# Patient Record
Sex: MALE | Race: WHITE | NOT HISPANIC OR LATINO | URBAN - METROPOLITAN AREA
[De-identification: names, ages, dates, MRNs, and addresses within clinical notes are randomized per-mention and may not be internally consistent; named-entity substitution may affect disease eponyms.]

---

## 2023-08-30 LAB
HEPATITIS B VIRUS SURFACE AB (MIU/ML) IN SERUM: <3.1 MIU/ML
MUMPS IGG ANTIBODY: POSITIVE
RUBELLA VIRUS IGG AB: POSITIVE
RUBEOLA IGG ANTIBODY: POSITIVE
VARICELLA ZOSTER IGG: POSITIVE

## 2023-09-01 LAB
NIL(NEG) CONTROL SPOT COUNT: NORMAL
PANEL A SPOT COUNT: 0
PANEL B SPOT COUNT: 0
POS CONTROL SPOT COUNT: NORMAL
T-SPOT. TB INTERPRETATION: NEGATIVE

## 2023-10-23 ENCOUNTER — APPOINTMENT (OUTPATIENT)
Dept: UROLOGY | Facility: HOSPITAL | Age: 53
End: 2023-10-23

## 2023-10-24 ENCOUNTER — PHARMACY VISIT (OUTPATIENT)
Dept: PHARMACY | Facility: CLINIC | Age: 53
End: 2023-10-24
Payer: COMMERCIAL

## 2023-10-24 ENCOUNTER — LAB (OUTPATIENT)
Dept: LAB | Facility: LAB | Age: 53
End: 2023-10-24
Payer: COMMERCIAL

## 2023-10-24 ENCOUNTER — OFFICE VISIT (OUTPATIENT)
Dept: UROLOGY | Facility: CLINIC | Age: 53
End: 2023-10-24
Payer: COMMERCIAL

## 2023-10-24 VITALS — HEIGHT: 67 IN | WEIGHT: 223 LBS | TEMPERATURE: 97.9 F | BODY MASS INDEX: 35 KG/M2

## 2023-10-24 DIAGNOSIS — Z12.5 PROSTATE CANCER SCREENING: ICD-10-CM

## 2023-10-24 DIAGNOSIS — N40.1 BENIGN PROSTATIC HYPERPLASIA WITH LOWER URINARY TRACT SYMPTOMS, SYMPTOM DETAILS UNSPECIFIED: ICD-10-CM

## 2023-10-24 DIAGNOSIS — N40.1 BENIGN PROSTATIC HYPERPLASIA WITH LOWER URINARY TRACT SYMPTOMS, SYMPTOM DETAILS UNSPECIFIED: Primary | ICD-10-CM

## 2023-10-24 PROCEDURE — 1036F TOBACCO NON-USER: CPT | Performed by: UROLOGY

## 2023-10-24 PROCEDURE — 36415 COLL VENOUS BLD VENIPUNCTURE: CPT

## 2023-10-24 PROCEDURE — 99204 OFFICE O/P NEW MOD 45 MIN: CPT | Performed by: UROLOGY

## 2023-10-24 PROCEDURE — 84153 ASSAY OF PSA TOTAL: CPT

## 2023-10-24 PROCEDURE — RXMED WILLOW AMBULATORY MEDICATION CHARGE

## 2023-10-24 PROCEDURE — 87086 URINE CULTURE/COLONY COUNT: CPT

## 2023-10-24 RX ORDER — TAMSULOSIN HYDROCHLORIDE 0.4 MG/1
0.4 CAPSULE ORAL NIGHTLY
Qty: 90 CAPSULE | Refills: 3 | Status: SHIPPED | OUTPATIENT
Start: 2023-10-24

## 2023-10-24 RX ORDER — ESOMEPRAZOLE MAGNESIUM 40 MG/1
40 CAPSULE, DELAYED RELEASE ORAL
COMMUNITY
Start: 2015-09-30 | End: 2023-11-13 | Stop reason: SDUPTHER

## 2023-10-24 RX ORDER — TAMSULOSIN HYDROCHLORIDE 0.4 MG/1
0.4 CAPSULE ORAL NIGHTLY
COMMUNITY
End: 2023-10-24 | Stop reason: SDUPTHER

## 2023-10-24 RX ORDER — FLUTICASONE PROPIONATE 50 MCG
SPRAY, SUSPENSION (ML) NASAL
COMMUNITY
End: 2023-11-13 | Stop reason: SDUPTHER

## 2023-10-24 RX ORDER — ALLOPURINOL 300 MG/1
300 TABLET ORAL DAILY
COMMUNITY
End: 2023-11-13 | Stop reason: SDUPTHER

## 2023-10-24 ASSESSMENT — PAIN SCALES - GENERAL: PAINLEVEL: 0-NO PAIN

## 2023-10-24 NOTE — PROGRESS NOTES
HPI  53 y.o. male here for BPH management.    #LUTS  -nocturia: no   -slow stream: no   -incomplete emptying:  no   -required catheter: no   -hematuria/dysuria: none  -previous UTIs: none     -urgency: none   -urge incontinence: none      -on medications: yes on flomax for 6 years at [facility] - works well, happy with this  -previous prostate surgery? none   -personal/family history of prostate cancer: none   -on any blood thinners:  no     Hx of prostatitis years ago     Transferring care to  due to insurance change    01/2023 Cr 1.10     - [place]    Current Medications:  Current Outpatient Medications   Medication Sig Dispense Refill    allopurinol (Zyloprim) 300 mg tablet Take 1 tablet (300 mg) by mouth once daily.      esomeprazole (NexIUM) 40 mg DR capsule Take 1 capsule (40 mg) by mouth once daily.      fluticasone (Flonase) 50 mcg/actuation nasal spray USE 1 SPRAY IN EACH NOSTRIL DAILY AT BEDTIME AS DIRECTED.      tamsulosin (Flomax) 0.4 mg 24 hr capsule Take 1 capsule (0.4 mg) by mouth once daily at bedtime.       No current facility-administered medications for this visit.        PMH:  No past medical history on file.    PSH:  No past surgical history on file.    FMH:  No family history on file.    SHx:  Social History     Tobacco Use    Smoking status: Never    Smokeless tobacco: Never   Substance Use Topics    Alcohol use: Yes    Drug use: Never       Allergies:  No Known Allergies    Physical Exam   Testicles descended bilaterally, nontender, no masses  Vasa palpable bilaterally  Penis circ'd, no lesions, no plaques    Assesment/Plan  #LUTS   We discussed the different causes for urinary difficulties as patients age, specifically BPH, bladder overactivity, and even stricture disease. We discussed different medications that could help him with his symptoms, including alpha blockers and finasteride, as well as surgical options such as TURP and Urolift.   -continue flomax 0.4mg,  refilled today  -PSA now and in 1yr  -PVR/ UAcx today     FU in 1 yr with CNP, sooner prn    Scribe Attestation  By signing my name below, I, Ila Carrasco-Jorge , Scrvinay   attest that this documentation has been prepared under the direction and in the presence of Danielle Aguilar MD.

## 2023-10-25 LAB
BACTERIA UR CULT: NO GROWTH
PSA SERPL-MCNC: 0.19 NG/ML

## 2023-11-09 PROBLEM — E66.811 OBESITY, CLASS I, BMI 30-34.9: Status: ACTIVE | Noted: 2021-10-13

## 2023-11-09 PROBLEM — N41.1 CHRONIC PROSTATITIS: Status: ACTIVE | Noted: 2017-12-22

## 2023-11-09 PROBLEM — J45.20 MILD INTERMITTENT ASTHMA WITHOUT COMPLICATION (HHS-HCC): Status: ACTIVE | Noted: 2018-08-22

## 2023-11-09 PROBLEM — N40.0 BPH (BENIGN PROSTATIC HYPERPLASIA): Status: ACTIVE | Noted: 2023-11-09

## 2023-11-09 PROBLEM — E66.9 OBESITY, CLASS I, BMI 30-34.9: Status: ACTIVE | Noted: 2021-10-13

## 2023-11-09 RX ORDER — ALBUTEROL SULFATE 90 UG/1
2 AEROSOL, METERED RESPIRATORY (INHALATION) EVERY 4 HOURS PRN
COMMUNITY
Start: 2022-08-10

## 2023-11-09 RX ORDER — LISINOPRIL 20 MG/1
20 TABLET ORAL DAILY
COMMUNITY
End: 2023-11-13 | Stop reason: ALTCHOICE

## 2023-11-09 RX ORDER — MONTELUKAST SODIUM 10 MG/1
1 TABLET ORAL NIGHTLY
COMMUNITY
Start: 2023-09-20 | End: 2023-11-13 | Stop reason: SDUPTHER

## 2023-11-09 RX ORDER — MECLIZINE HCL 12.5 MG 12.5 MG/1
TABLET ORAL
COMMUNITY
Start: 2022-11-04

## 2023-11-13 ENCOUNTER — OFFICE VISIT (OUTPATIENT)
Dept: PRIMARY CARE | Facility: CLINIC | Age: 53
End: 2023-11-13
Payer: COMMERCIAL

## 2023-11-13 ENCOUNTER — PHARMACY VISIT (OUTPATIENT)
Dept: PHARMACY | Facility: CLINIC | Age: 53
End: 2023-11-13
Payer: COMMERCIAL

## 2023-11-13 VITALS
HEIGHT: 65 IN | OXYGEN SATURATION: 96 % | WEIGHT: 222.38 LBS | DIASTOLIC BLOOD PRESSURE: 79 MMHG | HEART RATE: 65 BPM | BODY MASS INDEX: 37.05 KG/M2 | SYSTOLIC BLOOD PRESSURE: 144 MMHG

## 2023-11-13 DIAGNOSIS — J30.2 SEASONAL ALLERGIES: ICD-10-CM

## 2023-11-13 DIAGNOSIS — J45.20 MILD INTERMITTENT ASTHMA WITHOUT COMPLICATION (HHS-HCC): Primary | ICD-10-CM

## 2023-11-13 DIAGNOSIS — M1A.0620 IDIOPATHIC CHRONIC GOUT OF LEFT KNEE WITHOUT TOPHUS: ICD-10-CM

## 2023-11-13 DIAGNOSIS — E78.5 HYPERLIPIDEMIA, UNSPECIFIED HYPERLIPIDEMIA TYPE: ICD-10-CM

## 2023-11-13 DIAGNOSIS — K21.9 GASTROESOPHAGEAL REFLUX DISEASE WITHOUT ESOPHAGITIS: ICD-10-CM

## 2023-11-13 DIAGNOSIS — R42 VERTIGO: ICD-10-CM

## 2023-11-13 PROCEDURE — 99203 OFFICE O/P NEW LOW 30 MIN: CPT | Performed by: FAMILY MEDICINE

## 2023-11-13 PROCEDURE — 1036F TOBACCO NON-USER: CPT | Performed by: FAMILY MEDICINE

## 2023-11-13 PROCEDURE — RXMED WILLOW AMBULATORY MEDICATION CHARGE

## 2023-11-13 RX ORDER — MONTELUKAST SODIUM 10 MG/1
10 TABLET ORAL NIGHTLY
Qty: 90 TABLET | Refills: 1 | Status: SHIPPED | OUTPATIENT
Start: 2023-11-13 | End: 2024-05-13 | Stop reason: SDUPTHER

## 2023-11-13 RX ORDER — ALLOPURINOL 300 MG/1
300 TABLET ORAL DAILY
Qty: 90 TABLET | Refills: 1 | Status: SHIPPED | OUTPATIENT
Start: 2023-11-13 | End: 2024-05-03 | Stop reason: SDUPTHER

## 2023-11-13 RX ORDER — HYDROGEN PEROXIDE 3 %
20 SOLUTION, NON-ORAL MISCELLANEOUS
Qty: 90 CAPSULE | Refills: 1
Start: 2023-11-13

## 2023-11-13 RX ORDER — FLUTICASONE PROPIONATE 50 MCG
2 SPRAY, SUSPENSION (ML) NASAL DAILY
Qty: 48 G | Refills: 1 | Status: SHIPPED | OUTPATIENT
Start: 2023-11-13 | End: 2024-05-14 | Stop reason: SDUPTHER

## 2023-11-13 ASSESSMENT — ENCOUNTER SYMPTOMS
DEPRESSION: 0
TROUBLE SWALLOWING: 0
COUGH: 0
CONFUSION: 0
DYSURIA: 0
ABDOMINAL PAIN: 0
LIGHT-HEADEDNESS: 0
DIARRHEA: 0
UNEXPECTED WEIGHT CHANGE: 0
BLOOD IN STOOL: 0
WHEEZING: 0
NUMBNESS: 0
CHILLS: 0
NAUSEA: 0
DIFFICULTY URINATING: 0
OCCASIONAL FEELINGS OF UNSTEADINESS: 0
LOSS OF SENSATION IN FEET: 0
VOMITING: 0
WEAKNESS: 0
DIZZINESS: 0
FEVER: 0
SHORTNESS OF BREATH: 0

## 2023-11-13 ASSESSMENT — PATIENT HEALTH QUESTIONNAIRE - PHQ9
SUM OF ALL RESPONSES TO PHQ9 QUESTIONS 1 AND 2: 0
2. FEELING DOWN, DEPRESSED OR HOPELESS: NOT AT ALL
1. LITTLE INTEREST OR PLEASURE IN DOING THINGS: NOT AT ALL

## 2023-11-13 NOTE — PATIENT INSTRUCTIONS
Proton Pump Inhibitors (PPI, e.g., Prilosec/omeprazole, Nexium/esomeprazole, and others) may cause vitamin or mineral deficiencies, kidney damage, dementia, stomach polyps, fractures and thinning of the bones (osteoporosis), intestinal infections including C. diff., lupus. Some of these side effects are more likely with chronic use. There are other possible side effects, and it is recommended, as with any medication, to review all possible side effects.     Please return for your next wellness visit 12 months after your previous Wellness visit, earlier if any question or concern.     Please return for a follow-up appointment in 6 months, earlier if any question or concern.      For assistance with scheduling referrals or consultations, please call [phone]. For laboratory, radiology, and other tests, please call [phone] ([phone] for pediatrics). Please review prescription inserts and published information for possible adverse effects of all medications. Return after testing or consultation to review results and recommendations, if symptoms persist, change, worsen, or return, or if you have any question or concern. If you do not get results within 7-10 days, or you have any question or concern, please send a message, call the office ([phone]), or return to the office for a follow-up appointment. For non-emergencies, you may call the office. For emergencies, call 9-1-1 or go to the nearest Emergency Department. Please schedule additional appointment(s) to address concern(s) not addressed today.    In general, results are not released or discussed over the telephone. Results of tests done through [facility] are released via  [employer] (see below).  [email or web address]   Equidate support line: [phone]    Until we complete our transition to the new system, additional information can be found at [email or web address] or on your Android or 12ReturnS (TrackTik,  iPad) device using the Wev[employer] tevin available free of charge in your device's tevin store.

## 2023-11-13 NOTE — PROGRESS NOTES
"Subjective   Patient ID: Daniel Hicks is a 53 y.o. male who presents for Establish Care (Pt presents as new pt to establish care and get refills.BL).  HPI    [facility] PCP at [facility] last seen earlier 2023.    Feeling generally well.    Review of Systems   Constitutional:  Negative for chills, fever and unexpected weight change.   HENT:  Negative for ear pain and trouble swallowing.    Respiratory:  Negative for cough, shortness of breath and wheezing.    Cardiovascular:  Negative for chest pain.   Gastrointestinal:  Negative for abdominal pain, blood in stool, diarrhea, nausea and vomiting.   Genitourinary:  Negative for difficulty urinating and dysuria.   Skin:  Negative for rash.   Neurological:  Negative for dizziness, syncope, weakness, light-headedness and numbness.   Psychiatric/Behavioral:  Negative for behavioral problems and confusion.          Objective   /79   Pulse 65   Ht 1.657 m (5' 5.25\")   Wt 101 kg (222 lb 6 oz)   SpO2 96%   BMI 36.72 kg/m²     Physical Exam  Vitals and nursing note reviewed.   Constitutional:       General: He is not in acute distress.     Appearance: He is not diaphoretic.   HENT:      Head: Normocephalic and atraumatic.   Cardiovascular:      Rate and Rhythm: Normal rate and regular rhythm.      Heart sounds: Normal heart sounds.   Pulmonary:      Effort: Pulmonary effort is normal.      Breath sounds: Normal breath sounds.   Abdominal:      General: Bowel sounds are normal. There is no distension.      Palpations: Abdomen is soft. There is no mass.      Tenderness: There is no abdominal tenderness. There is no guarding or rebound.   Musculoskeletal:      Right lower leg: No edema.      Left lower leg: No edema.   Skin:     General: Skin is warm and dry.   Neurological:      General: No focal deficit present.      Mental Status: He is alert. Mental status is at baseline.   Psychiatric:         Mood and Affect: Mood normal.         Thought Content: Thought content normal. "         Assessment/Plan   Problem List Items Addressed This Visit       Asthma - Primary     Well controlled.          Relevant Medications    montelukast (Singulair) 10 mg tablet    Gastroesophageal reflux disease without esophagitis     Try to minimize or avoid chronic PPI use.         Relevant Medications    esomeprazole (NexIUM) 20 mg DR capsule    Idiopathic chronic gout of left knee without tophus    Relevant Medications    allopurinol (Zyloprim) 300 mg tablet    Other Relevant Orders    Uric acid    Vertigo     Has plenty of Meclizine.         Hyperlipidemia     Recheck.         Relevant Orders    Lipid Panel     Other Visit Diagnoses       Seasonal allergies        Relevant Medications    fluticasone (Flonase) 50 mcg/actuation nasal spray

## 2023-11-21 ENCOUNTER — LAB (OUTPATIENT)
Dept: LAB | Facility: LAB | Age: 53
End: 2023-11-21
Payer: COMMERCIAL

## 2023-11-21 DIAGNOSIS — M1A.0620 IDIOPATHIC CHRONIC GOUT OF LEFT KNEE WITHOUT TOPHUS: ICD-10-CM

## 2023-11-21 DIAGNOSIS — E78.5 HYPERLIPIDEMIA, UNSPECIFIED HYPERLIPIDEMIA TYPE: ICD-10-CM

## 2023-11-21 LAB
CHOLEST SERPL-MCNC: 171 MG/DL (ref 0–199)
CHOLESTEROL/HDL RATIO: 4.4
HDLC SERPL-MCNC: 38.6 MG/DL
LDLC SERPL CALC-MCNC: 65 MG/DL
NON HDL CHOLESTEROL: 132 MG/DL (ref 0–149)
TRIGL SERPL-MCNC: 335 MG/DL (ref 0–149)
URATE SERPL-MCNC: 4.6 MG/DL (ref 4–7.5)
VLDL: 67 MG/DL (ref 0–40)

## 2023-11-21 PROCEDURE — 84550 ASSAY OF BLOOD/URIC ACID: CPT

## 2023-11-21 PROCEDURE — 36415 COLL VENOUS BLD VENIPUNCTURE: CPT

## 2023-11-21 PROCEDURE — 80061 LIPID PANEL: CPT

## 2023-12-08 ENCOUNTER — PHARMACY VISIT (OUTPATIENT)
Dept: PHARMACY | Facility: CLINIC | Age: 53
End: 2023-12-08
Payer: COMMERCIAL

## 2023-12-08 PROCEDURE — RXMED WILLOW AMBULATORY MEDICATION CHARGE

## 2024-01-08 PROCEDURE — RXMED WILLOW AMBULATORY MEDICATION CHARGE

## 2024-01-09 ENCOUNTER — PHARMACY VISIT (OUTPATIENT)
Dept: PHARMACY | Facility: CLINIC | Age: 54
End: 2024-01-09
Payer: COMMERCIAL

## 2024-01-16 ENCOUNTER — PHARMACY VISIT (OUTPATIENT)
Dept: PHARMACY | Facility: CLINIC | Age: 54
End: 2024-01-16
Payer: COMMERCIAL

## 2024-01-16 PROCEDURE — RXMED WILLOW AMBULATORY MEDICATION CHARGE

## 2024-01-26 ENCOUNTER — TRANSCRIBE ORDERS (OUTPATIENT)
Dept: SCHEDULING | Age: 54
End: 2024-01-26
Payer: COMMERCIAL

## 2024-01-26 DIAGNOSIS — Z02.1 ENCOUNTER FOR PRE-EMPLOYMENT EXAMINATION: Primary | ICD-10-CM

## 2024-02-02 ENCOUNTER — HOSPITAL ENCOUNTER (OUTPATIENT)
Dept: CARDIOLOGY | Facility: HOSPITAL | Age: 54
Discharge: HOME | End: 2024-02-02
Payer: COMMERCIAL

## 2024-02-02 ENCOUNTER — HOSPITAL ENCOUNTER (OUTPATIENT)
Dept: RADIOLOGY | Facility: CLINIC | Age: 54
Discharge: HOME | End: 2024-02-02
Payer: COMMERCIAL

## 2024-02-02 DIAGNOSIS — Z02.1 ENCOUNTER FOR PRE-EMPLOYMENT EXAMINATION: ICD-10-CM

## 2024-02-02 PROCEDURE — 93016 CV STRESS TEST SUPVJ ONLY: CPT | Performed by: STUDENT IN AN ORGANIZED HEALTH CARE EDUCATION/TRAINING PROGRAM

## 2024-02-02 PROCEDURE — 93017 CV STRESS TEST TRACING ONLY: CPT

## 2024-02-02 PROCEDURE — 71046 X-RAY EXAM CHEST 2 VIEWS: CPT | Performed by: RADIOLOGY

## 2024-02-02 PROCEDURE — 71046 X-RAY EXAM CHEST 2 VIEWS: CPT

## 2024-02-02 PROCEDURE — 93018 CV STRESS TEST I&R ONLY: CPT | Performed by: STUDENT IN AN ORGANIZED HEALTH CARE EDUCATION/TRAINING PROGRAM

## 2024-02-05 ENCOUNTER — PHARMACY VISIT (OUTPATIENT)
Dept: PHARMACY | Facility: CLINIC | Age: 54
End: 2024-02-05
Payer: COMMERCIAL

## 2024-02-05 PROCEDURE — RXMED WILLOW AMBULATORY MEDICATION CHARGE

## 2024-02-07 ENCOUNTER — APPOINTMENT (OUTPATIENT)
Dept: CARDIOLOGY | Facility: HOSPITAL | Age: 54
End: 2024-02-07
Payer: COMMERCIAL

## 2024-02-19 PROCEDURE — RXMED WILLOW AMBULATORY MEDICATION CHARGE

## 2024-02-20 ENCOUNTER — PHARMACY VISIT (OUTPATIENT)
Dept: PHARMACY | Facility: CLINIC | Age: 54
End: 2024-02-20
Payer: COMMERCIAL

## 2024-03-20 PROCEDURE — RXMED WILLOW AMBULATORY MEDICATION CHARGE

## 2024-03-22 ENCOUNTER — PHARMACY VISIT (OUTPATIENT)
Dept: PHARMACY | Facility: CLINIC | Age: 54
End: 2024-03-22
Payer: COMMERCIAL

## 2024-04-15 PROCEDURE — RXMED WILLOW AMBULATORY MEDICATION CHARGE

## 2024-04-18 ENCOUNTER — PHARMACY VISIT (OUTPATIENT)
Dept: PHARMACY | Facility: CLINIC | Age: 54
End: 2024-04-18
Payer: COMMERCIAL

## 2024-05-03 DIAGNOSIS — M1A.0620 IDIOPATHIC CHRONIC GOUT OF LEFT KNEE WITHOUT TOPHUS: ICD-10-CM

## 2024-05-03 RX ORDER — ALLOPURINOL 300 MG/1
300 TABLET ORAL DAILY
Qty: 90 TABLET | Refills: 3 | Status: SHIPPED | OUTPATIENT
Start: 2024-05-03

## 2024-05-13 DIAGNOSIS — J45.20 MILD INTERMITTENT ASTHMA WITHOUT COMPLICATION (HHS-HCC): ICD-10-CM

## 2024-05-14 DIAGNOSIS — J30.2 SEASONAL ALLERGIES: ICD-10-CM

## 2024-05-15 RX ORDER — FLUTICASONE PROPIONATE 50 MCG
2 SPRAY, SUSPENSION (ML) NASAL DAILY
Qty: 48 G | Refills: 0 | Status: SHIPPED | OUTPATIENT
Start: 2024-05-15

## 2024-05-15 RX ORDER — MONTELUKAST SODIUM 10 MG/1
10 TABLET ORAL NIGHTLY
Qty: 90 TABLET | Refills: 0 | Status: SHIPPED | OUTPATIENT
Start: 2024-05-15

## 2024-05-16 PROCEDURE — RXMED WILLOW AMBULATORY MEDICATION CHARGE

## 2024-05-17 ENCOUNTER — PHARMACY VISIT (OUTPATIENT)
Dept: PHARMACY | Facility: CLINIC | Age: 54
End: 2024-05-17
Payer: COMMERCIAL

## 2024-07-02 ENCOUNTER — APPOINTMENT (OUTPATIENT)
Dept: PRIMARY CARE | Facility: CLINIC | Age: 54
End: 2024-07-02
Payer: COMMERCIAL

## 2024-07-15 PROCEDURE — RXMED WILLOW AMBULATORY MEDICATION CHARGE

## 2024-07-16 ENCOUNTER — PHARMACY VISIT (OUTPATIENT)
Dept: PHARMACY | Facility: CLINIC | Age: 54
End: 2024-07-16
Payer: MEDICARE

## 2024-08-06 PROBLEM — J30.2 SEASONAL ALLERGIES: Status: ACTIVE | Noted: 2024-08-06

## 2024-08-06 RX ORDER — FLUTICASONE PROPIONATE AND SALMETEROL 50; 250 UG/1; UG/1
POWDER RESPIRATORY (INHALATION)
COMMUNITY
End: 2024-08-07

## 2024-08-07 ENCOUNTER — TELEMEDICINE (OUTPATIENT)
Dept: PRIMARY CARE | Facility: CLINIC | Age: 54
End: 2024-08-07
Payer: COMMERCIAL

## 2024-08-07 ENCOUNTER — APPOINTMENT (OUTPATIENT)
Dept: PRIMARY CARE | Facility: CLINIC | Age: 54
End: 2024-08-07
Payer: COMMERCIAL

## 2024-08-07 DIAGNOSIS — K21.9 GASTROESOPHAGEAL REFLUX DISEASE, UNSPECIFIED WHETHER ESOPHAGITIS PRESENT: ICD-10-CM

## 2024-08-07 DIAGNOSIS — L98.9 SKIN LESION: ICD-10-CM

## 2024-08-07 DIAGNOSIS — M1A.0620 IDIOPATHIC CHRONIC GOUT OF LEFT KNEE WITHOUT TOPHUS: ICD-10-CM

## 2024-08-07 DIAGNOSIS — E78.5 DYSLIPIDEMIA: ICD-10-CM

## 2024-08-07 DIAGNOSIS — J45.20 MILD INTERMITTENT ASTHMA WITHOUT COMPLICATION (HHS-HCC): Primary | ICD-10-CM

## 2024-08-07 PROCEDURE — 99212 OFFICE O/P EST SF 10 MIN: CPT | Performed by: FAMILY MEDICINE

## 2024-08-07 RX ORDER — MONTELUKAST SODIUM 10 MG/1
10 TABLET ORAL NIGHTLY
Qty: 100 TABLET | Refills: 1 | Status: SHIPPED | OUTPATIENT
Start: 2024-08-07

## 2024-08-07 RX ORDER — FAMOTIDINE 20 MG/1
TABLET, FILM COATED ORAL
COMMUNITY
End: 2024-08-07 | Stop reason: DRUGHIGH

## 2024-08-07 RX ORDER — FAMOTIDINE 20 MG/1
20 TABLET, FILM COATED ORAL DAILY
Qty: 100 TABLET | Refills: 1 | Status: SHIPPED | OUTPATIENT
Start: 2024-08-07

## 2024-08-07 ASSESSMENT — ENCOUNTER SYMPTOMS
COUGH: 0
UNEXPECTED WEIGHT CHANGE: 0
LIGHT-HEADEDNESS: 0
WHEEZING: 0
CHEST TIGHTNESS: 0
DIAPHORESIS: 0
HEADACHES: 0
CHILLS: 0
FEVER: 0
DIZZINESS: 0
APNEA: 0
PALPITATIONS: 0
CHOKING: 0
SHORTNESS OF BREATH: 0

## 2024-08-07 NOTE — PROGRESS NOTES
Subjective   Daniel Hicks is a 54 y.o. male who presents for Follow-up.    HPI    Generally feeling well.     c/o skin lesion on face, which he would like to have removed.      Review of Systems   Constitutional:  Negative for chills, diaphoresis, fever and unexpected weight change.   Eyes:  Negative for visual disturbance.   Respiratory:  Negative for apnea (no PND), cough, choking, chest tightness, shortness of breath and wheezing.    Cardiovascular:  Negative for chest pain, palpitations and leg swelling.   Neurological:  Negative for dizziness, syncope, light-headedness and headaches.   All other systems reviewed and are negative.      Objective   Physical Exam  Within the limits of a virtual encounter, patient appears to be in no acute distress.       Assessment & Plan  Mild intermittent asthma without complication (HHS-HCC)  Well controlled. Continue Singulair. Reports rescue inhalers are not .  Orders:    montelukast (Singulair) 10 mg tablet; Take 1 tablet (10 mg) by mouth once daily at bedtime.    Gastroesophageal reflux disease, unspecified whether esophagitis present  Well controlled with Pepcid just once daily 20 mg.       Skin lesion  Follow-up with dermatology.  Orders:    Referral to Dermatology    Idiopathic chronic gout of left knee without tophus  Recheck uric acid.  Orders:    Uric Acid; Future    Dyslipidemia  Recheck.  Orders:    Comprehensive Metabolic Panel; Future    Lipid Panel; Future    Hemoglobin A1C; Future    CBC; Future         This visit was completed via videoconference due to the restrictions of the COVID-19 pandemic. Consent for virtual encounter was obtained. Current location of the patient in the [facility] was confirmed. All issues as above were discussed and addressed but no physical exam was performed. If it was felt that the patient should be evaluated in clinic then they were directed there. The patient verbally consented to this visit.  Spent 16 minutes with  patient face-to-face and more than 50% of this time was spent in counseling and coordination of care.  We attempted to have a virtual visit via audio and video.  We experienced technical difficulties with the video portion of the visit but did complete the virtual visit by telephone.      Ariel Posadas, DO

## 2024-08-07 NOTE — PATIENT INSTRUCTIONS
Please schedule an in-office annual exam and Wellness visit, for physical exam and to address routine health maintenance matters (e.g., labs results, cancer screening, etc.).    Dermatology  Dr. Nora Porter, Alfonso Gonzalez CNP PhD, et al. ([place]) [phone]    [facility]  Dr. Km Olsen NP-C  Dulce Zapien PA-C  Tayler Hidalgo FNP-C  [address]  [place], [place] [id number] [phone]    Dr. Linette Monreal PA-C  Sejal Ambriz NP-C  [address], [place]  [phone]    [facility]  [address], [address]  [place] [address]  [phone] ([phone]    [address], [address]  [place], [place] [id number] [phone]    [address], [address]  [place], [place] [address] [phone]      Please return for a(n) asthma, acid reflux, and medication follow-up appointment in 6 months, earlier if any question or concern. Please return for your next Wellness visit within the next 1-2 months, or 12 months after your last Wellness visit. Please schedule additional problem-focused appointment(s) to address additional problem(s).    Avoid taking Biotin (a vitamin, shows up particularly in hair/nail supplements) for a week prior to any blood tests, as it can interfere with certain results. Fasting for labs means 12 hours, nothing to eat or drink, except water and medications, unless directed otherwise.    For assistance with scheduling referrals or consultations, please call [phone]. For laboratory, radiology, and other tests, please call [phone] ([phone] for pediatrics). Please review prescription inserts and published information for possible adverse effects of all medications. Return after testing or consultation to review results and recommendations, if symptoms persist, change, worsen, or return, or if you have any question or concern. If you do not get results within 7-10 days, or you have any question or concern,  please send a message, call the office ([phone]), or return to the office for a follow-up appointment. For non-emergencies, you may call the office. For emergencies, call 9-1-1 or go to the nearest Emergency Department. Please schedule additional appointment(s) to address concern(s) not addressed today.    In general, results are not released or discussed over the telephone, but at an appointment or via  [email or web address].com. Results of tests done through [facility] are released via  [email or web address].com (see below).  [email or web address]   eStartAcademy[employer]com support line: [phone]

## 2024-08-07 NOTE — ASSESSMENT & PLAN NOTE
Recheck.  Orders:    Comprehensive Metabolic Panel; Future    Lipid Panel; Future    Hemoglobin A1C; Future    CBC; Future

## 2024-08-07 NOTE — ASSESSMENT & PLAN NOTE
Well controlled. Continue Singulair. Reports rescue inhalers are not .  Orders:    montelukast (Singulair) 10 mg tablet; Take 1 tablet (10 mg) by mouth once daily at bedtime.

## 2024-08-12 ENCOUNTER — TELEPHONE (OUTPATIENT)
Dept: PRIMARY CARE | Facility: CLINIC | Age: 54
End: 2024-08-12
Payer: COMMERCIAL

## 2024-08-12 DIAGNOSIS — J45.20 MILD INTERMITTENT ASTHMA WITHOUT COMPLICATION (HHS-HCC): ICD-10-CM

## 2024-08-12 PROCEDURE — RXMED WILLOW AMBULATORY MEDICATION CHARGE

## 2024-08-12 RX ORDER — FAMOTIDINE 20 MG/1
20 TABLET, FILM COATED ORAL DAILY
Qty: 100 TABLET | Refills: 0 | Status: SHIPPED | OUTPATIENT
Start: 2024-08-12

## 2024-08-12 RX ORDER — MONTELUKAST SODIUM 10 MG/1
10 TABLET ORAL NIGHTLY
Qty: 100 TABLET | Refills: 0 | Status: SHIPPED | OUTPATIENT
Start: 2024-08-12

## 2024-08-12 NOTE — TELEPHONE ENCOUNTER
MEDICATION REFILL    Pt states the Rx that was sent on 08/07/2024 has not been filled by previous pharmacy ([facility]), they stated they did not receive.  Pt would prefer to use [facility].      Pharmacy Name:  [facility]   Medication requested           Montelukast    10 mg    1 tab daily           Famotidine      20 mg     1 tab daily  Dosage   (see above)  Quantity       90 days   Allergies    none given  Date of last visit   08/07/2024  Date of Next Appointment [  ]

## 2024-08-12 NOTE — TELEPHONE ENCOUNTER
Rx Refill Request Telephone Encounter    Name:  Daniel Hicks  :  [id number]  Medication Name:    montelukast (Singulair) 10 mg tablet     States that the refill date isnt until October. Wants to know if the other pharmacy may still have the refill request and that may be what's stopping this refill. Please call.  Specific Pharmacy location:  [facility]

## 2024-08-15 ENCOUNTER — PHARMACY VISIT (OUTPATIENT)
Dept: PHARMACY | Facility: CLINIC | Age: 54
End: 2024-08-15
Payer: MEDICARE

## 2024-09-19 DIAGNOSIS — J30.2 SEASONAL ALLERGIES: ICD-10-CM

## 2024-09-20 RX ORDER — FLUTICASONE PROPIONATE 50 MCG
2 SPRAY, SUSPENSION (ML) NASAL DAILY
Qty: 48 G | Refills: 0 | Status: SHIPPED | OUTPATIENT
Start: 2024-09-20

## 2024-09-23 ENCOUNTER — PHARMACY VISIT (OUTPATIENT)
Dept: PHARMACY | Facility: CLINIC | Age: 54
End: 2024-09-23
Payer: COMMERCIAL

## 2024-09-23 PROCEDURE — RXMED WILLOW AMBULATORY MEDICATION CHARGE

## 2024-10-02 DIAGNOSIS — Z12.5 PROSTATE CANCER SCREENING: ICD-10-CM

## 2024-10-02 DIAGNOSIS — N40.1 BENIGN PROSTATIC HYPERPLASIA WITH LOWER URINARY TRACT SYMPTOMS, SYMPTOM DETAILS UNSPECIFIED: ICD-10-CM

## 2024-10-23 PROCEDURE — RXMED WILLOW AMBULATORY MEDICATION CHARGE

## 2024-10-24 ENCOUNTER — LAB (OUTPATIENT)
Dept: LAB | Facility: LAB | Age: 54
End: 2024-10-24
Payer: COMMERCIAL

## 2024-10-24 ENCOUNTER — PHARMACY VISIT (OUTPATIENT)
Dept: PHARMACY | Facility: CLINIC | Age: 54
End: 2024-10-24
Payer: COMMERCIAL

## 2024-10-24 DIAGNOSIS — E78.5 DYSLIPIDEMIA: ICD-10-CM

## 2024-10-24 DIAGNOSIS — M1A.0620 IDIOPATHIC CHRONIC GOUT OF LEFT KNEE WITHOUT TOPHUS: ICD-10-CM

## 2024-10-24 LAB
ALBUMIN SERPL BCP-MCNC: 4.2 G/DL (ref 3.4–5)
ALP SERPL-CCNC: 83 U/L (ref 33–120)
ALT SERPL W P-5'-P-CCNC: 24 U/L (ref 10–52)
ANION GAP SERPL CALC-SCNC: 10 MMOL/L (ref 10–20)
AST SERPL W P-5'-P-CCNC: 28 U/L (ref 9–39)
BILIRUB SERPL-MCNC: 0.3 MG/DL (ref 0–1.2)
BUN SERPL-MCNC: 23 MG/DL (ref 6–23)
CALCIUM SERPL-MCNC: 9.3 MG/DL (ref 8.6–10.3)
CHLORIDE SERPL-SCNC: 103 MMOL/L (ref 98–107)
CHOLEST SERPL-MCNC: 210 MG/DL (ref 0–199)
CHOLESTEROL/HDL RATIO: 5.8
CO2 SERPL-SCNC: 28 MMOL/L (ref 21–32)
CREAT SERPL-MCNC: 1.15 MG/DL (ref 0.5–1.3)
EGFRCR SERPLBLD CKD-EPI 2021: 77 ML/MIN/1.73M*2
ERYTHROCYTE [DISTWIDTH] IN BLOOD BY AUTOMATED COUNT: 13.2 % (ref 11.5–14.5)
GLUCOSE SERPL-MCNC: 119 MG/DL (ref 74–99)
HCT VFR BLD AUTO: 44.2 % (ref 41–52)
HDLC SERPL-MCNC: 36.2 MG/DL
HGB BLD-MCNC: 15.1 G/DL (ref 13.5–17.5)
LDLC SERPL CALC-MCNC: ABNORMAL MG/DL
MCH RBC QN AUTO: 30.6 PG (ref 26–34)
MCHC RBC AUTO-ENTMCNC: 34.2 G/DL (ref 32–36)
MCV RBC AUTO: 90 FL (ref 80–100)
NON HDL CHOLESTEROL: 174 MG/DL (ref 0–149)
NRBC BLD-RTO: 0 /100 WBCS (ref 0–0)
PLATELET # BLD AUTO: 178 X10*3/UL (ref 150–450)
POTASSIUM SERPL-SCNC: 4.2 MMOL/L (ref 3.5–5.3)
PROT SERPL-MCNC: 6.7 G/DL (ref 6.4–8.2)
RBC # BLD AUTO: 4.93 X10*6/UL (ref 4.5–5.9)
SODIUM SERPL-SCNC: 137 MMOL/L (ref 136–145)
TRIGL SERPL-MCNC: 511 MG/DL (ref 0–149)
URATE SERPL-MCNC: 5 MG/DL (ref 4–7.5)
VLDL: ABNORMAL
WBC # BLD AUTO: 5.2 X10*3/UL (ref 4.4–11.3)

## 2024-10-25 LAB
EST. AVERAGE GLUCOSE BLD GHB EST-MCNC: 117 MG/DL
HBA1C MFR BLD: 5.7 %

## 2024-11-06 PROCEDURE — RXMED WILLOW AMBULATORY MEDICATION CHARGE

## 2024-11-08 ENCOUNTER — PHARMACY VISIT (OUTPATIENT)
Dept: PHARMACY | Facility: CLINIC | Age: 54
End: 2024-11-08
Payer: MEDICARE

## 2024-11-08 DIAGNOSIS — N40.1 BENIGN PROSTATIC HYPERPLASIA WITH LOWER URINARY TRACT SYMPTOMS, SYMPTOM DETAILS UNSPECIFIED: ICD-10-CM

## 2024-11-08 PROCEDURE — RXMED WILLOW AMBULATORY MEDICATION CHARGE

## 2024-11-08 RX ORDER — TAMSULOSIN HYDROCHLORIDE 0.4 MG/1
0.4 CAPSULE ORAL NIGHTLY
Qty: 90 CAPSULE | Refills: 3 | Status: SHIPPED | OUTPATIENT
Start: 2024-11-08

## 2024-11-11 ENCOUNTER — PHARMACY VISIT (OUTPATIENT)
Dept: PHARMACY | Facility: CLINIC | Age: 54
End: 2024-11-11
Payer: MEDICARE

## 2024-12-03 ENCOUNTER — APPOINTMENT (OUTPATIENT)
Dept: UROLOGY | Facility: CLINIC | Age: 54
End: 2024-12-03
Payer: COMMERCIAL

## 2024-12-03 VITALS — TEMPERATURE: 98.4 F

## 2024-12-03 DIAGNOSIS — Z12.5 PROSTATE CANCER SCREENING: Primary | ICD-10-CM

## 2024-12-03 DIAGNOSIS — N40.1 BENIGN PROSTATIC HYPERPLASIA WITH LOWER URINARY TRACT SYMPTOMS, SYMPTOM DETAILS UNSPECIFIED: ICD-10-CM

## 2024-12-03 PROCEDURE — G2211 COMPLEX E/M VISIT ADD ON: HCPCS | Performed by: UROLOGY

## 2024-12-03 PROCEDURE — 99214 OFFICE O/P EST MOD 30 MIN: CPT | Performed by: UROLOGY

## 2024-12-03 PROCEDURE — 1036F TOBACCO NON-USER: CPT | Performed by: UROLOGY

## 2024-12-03 RX ORDER — TAMSULOSIN HYDROCHLORIDE 0.4 MG/1
0.4 CAPSULE ORAL NIGHTLY
Qty: 90 CAPSULE | Refills: 3 | Status: SHIPPED | OUTPATIENT
Start: 2024-12-03

## 2024-12-03 ASSESSMENT — PAIN SCALES - GENERAL: PAINLEVEL_OUTOF10: 0-NO PAIN

## 2024-12-03 NOTE — PROGRESS NOTES
HPI  54 y.o. male here for BPH management.    Last Visit 10/24/23:  #LUTS   -continue flomax 0.4mg, refilled today  -PSA now and in 1yr  -PVR/ UAcx today   FU in 1 yr with CNP, sooner prn    Today's Visit  Primary care x2 reviewed     #LUTS  - still on flomax 0.4mg   -denies s/e  -nocturia: no   -slow stream: no   -incomplete emptying:  no   -required catheter: no   -hematuria/dysuria: none  -previous UTIs: none     -urgency: none   -urge incontinence: none      -on medications: yes on flomax for 6 years at [facility] - works well, happy with this  -previous prostate surgery? none   -personal/family history of prostate cancer: none   -on any blood thinners:  no     Hx of prostatitis years ago     Transferring care to  due to insurance change    01/2023 Cr 1.10     - [place]    Labs:  PSA 10/24/23: 0.19    Component      Latest Ref Rng 10/24/2024   Hemoglobin A1C      See comment % 5.7 (H)    Estimated Average Glucose      Not Established mg/dL 117       Legend:  (H) High  CBC:  Component      Latest Ref Rng 10/24/2024   WBC      4.4 - 11.3 x10*3/uL 5.2    nRBC      0.0 - 0.0 /100 WBCs 0.0    RBC      4.50 - 5.90 x10*6/uL 4.93    HEMOGLOBIN      13.5 - 17.5 g/dL 15.1    HEMATOCRIT      41.0 - 52.0 % 44.2    MCV      80 - 100 fL 90    MCH      26.0 - 34.0 pg 30.6    MCHC      32.0 - 36.0 g/dL 34.2    RED CELL DISTRIBUTION WIDTH      11.5 - 14.5 % 13.2    Platelets      150 - 450 x10*3/uL 178        Current Medications:  Current Outpatient Medications   Medication Sig Dispense Refill    albuterol 90 mcg/actuation inhaler Inhale 2 puffs every 4 hours if needed.      allopurinol (Zyloprim) 300 mg tablet Take 1 tablet (300 mg) by mouth once daily. 90 tablet 3    famotidine (Pepcid) 20 mg tablet Take 1 tablet (20 mg) by mouth once daily. 100 tablet 0    fluticasone (Flonase) 50 mcg/actuation nasal spray Administer 2 sprays into each nostril once daily. Shake gently. Before first use, prime pump. After  use, clean tip and replace cap. 48 g 0    montelukast (Singulair) 10 mg tablet Take 1 tablet (10 mg) by mouth once daily at bedtime. 100 tablet 0    tamsulosin (Flomax) 0.4 mg 24 hr capsule Take 1 capsule (0.4 mg) by mouth once daily at bedtime. 90 capsule 3     No current facility-administered medications for this visit.        PMH:  Past Medical History:   Diagnosis Date    Allergic     Asthma ([facility])     Benign prostatic hyperplasia 6 years ago    GERD (gastroesophageal reflux disease)     Prostatitis 6 years ago       PSH:  No past surgical history on file.    FMH:  Family History   Problem Relation Name Age of Onset    Heart disease Mother Shahrzad Murray     Hypertension Mother Shahrzad Murray     Kidney disease Mother Shahrzad Murray        SHx:  Social History     Tobacco Use    Smoking status: Former     Current packs/day: 0.00     Average packs/day: 1 pack/day for 30.0 years (30.0 ttl pk-yrs)     Types: Cigarettes     Start date: 1990     Quit date: 2019     Years since quittin.9    Smokeless tobacco: Never   Substance Use Topics    Alcohol use: Yes     Alcohol/week: 6.0 standard drinks of alcohol     Types: 6 Shots of liquor per week    Drug use: Never       Allergies:  No Known Allergies    Physical Exam   Testicles descended bilaterally, nontender, no masses  Vasa palpable bilaterally  Penis circ'd, no lesions, no plaques    Assesment/Plan  #LUTS   We discussed the different causes for urinary difficulties as patients age, specifically BPH, bladder overactivity, and even stricture disease. We discussed different medications that could help him with his symptoms, including alpha blockers and finasteride, as well as surgical options such as TURP and Urolift.   -continue flomax 0.4mg, refilled today  -PSA now and in 1yr  -PVR 49 today    FU in 1 yr with CNP, sooner prn    G [id number]  Visit complexity inherent to evaluation and management (E&M) associated with medical care services that serve as the  continuing focal point for all needed health care services and/or with medical care services that are part of ongoing care related to a patient's single, serious condition or a complex condition.      Scribe Attestation  By signing my name below, IGraciela Scribkellie   attest that this documentation has been prepared under the direction and in the presence of Danielle Aguilar MD.

## 2024-12-16 DIAGNOSIS — J30.2 SEASONAL ALLERGIES: ICD-10-CM

## 2024-12-17 RX ORDER — FLUTICASONE PROPIONATE 50 MCG
2 SPRAY, SUSPENSION (ML) NASAL DAILY
Qty: 48 G | Refills: 0 | Status: SHIPPED | OUTPATIENT
Start: 2024-12-17

## 2024-12-18 PROCEDURE — RXMED WILLOW AMBULATORY MEDICATION CHARGE

## 2024-12-24 ENCOUNTER — PHARMACY VISIT (OUTPATIENT)
Dept: PHARMACY | Facility: CLINIC | Age: 54
End: 2024-12-24
Payer: MEDICARE

## 2025-01-22 ENCOUNTER — LAB (OUTPATIENT)
Dept: LAB | Facility: LAB | Age: 55
End: 2025-01-22
Payer: COMMERCIAL

## 2025-01-22 DIAGNOSIS — Z12.5 PROSTATE CANCER SCREENING: ICD-10-CM

## 2025-01-22 DIAGNOSIS — N40.1 BENIGN PROSTATIC HYPERPLASIA WITH LOWER URINARY TRACT SYMPTOMS, SYMPTOM DETAILS UNSPECIFIED: ICD-10-CM

## 2025-01-22 PROCEDURE — 84153 ASSAY OF PSA TOTAL: CPT

## 2025-01-23 LAB — PSA SERPL-MCNC: 0.18 NG/ML

## 2025-01-25 DIAGNOSIS — J45.20 MILD INTERMITTENT ASTHMA WITHOUT COMPLICATION (HHS-HCC): ICD-10-CM

## 2025-01-27 ENCOUNTER — TELEPHONE (OUTPATIENT)
Dept: PRIMARY CARE | Facility: CLINIC | Age: 55
End: 2025-01-27
Payer: COMMERCIAL

## 2025-01-27 DIAGNOSIS — M1A.0620 IDIOPATHIC CHRONIC GOUT OF LEFT KNEE WITHOUT TOPHUS: ICD-10-CM

## 2025-01-27 RX ORDER — MONTELUKAST SODIUM 10 MG/1
10 TABLET ORAL NIGHTLY
Qty: 100 TABLET | Refills: 0 | Status: SHIPPED | OUTPATIENT
Start: 2025-01-27

## 2025-01-27 RX ORDER — ALLOPURINOL 300 MG/1
300 TABLET ORAL DAILY
Qty: 90 TABLET | Refills: 1 | Status: SHIPPED | OUTPATIENT
Start: 2025-01-27

## 2025-01-27 NOTE — TELEPHONE ENCOUNTER
Medication Refill Request:     Patient has 3 doses on hand     Medication: allopurinol (Zyloprim) 300 mg tablet  1 po every day     Qty: 90       LOV: 11/13/2023    NOV: 2/03/2025      Pharmacy: [facility]

## 2025-01-28 PROCEDURE — RXMED WILLOW AMBULATORY MEDICATION CHARGE

## 2025-01-29 ENCOUNTER — PHARMACY VISIT (OUTPATIENT)
Dept: PHARMACY | Facility: CLINIC | Age: 55
End: 2025-01-29
Payer: MEDICARE

## 2025-02-03 ENCOUNTER — APPOINTMENT (OUTPATIENT)
Dept: PRIMARY CARE | Facility: CLINIC | Age: 55
End: 2025-02-03
Payer: COMMERCIAL

## 2025-02-03 VITALS
WEIGHT: 220.5 LBS | DIASTOLIC BLOOD PRESSURE: 85 MMHG | BODY MASS INDEX: 36.41 KG/M2 | OXYGEN SATURATION: 93 % | SYSTOLIC BLOOD PRESSURE: 124 MMHG | HEART RATE: 98 BPM

## 2025-02-03 DIAGNOSIS — M1A.0620 IDIOPATHIC CHRONIC GOUT OF LEFT KNEE WITHOUT TOPHUS: ICD-10-CM

## 2025-02-03 DIAGNOSIS — Z12.11 SCREENING FOR COLON CANCER: ICD-10-CM

## 2025-02-03 DIAGNOSIS — E78.5 DYSLIPIDEMIA: ICD-10-CM

## 2025-02-03 DIAGNOSIS — J30.2 SEASONAL ALLERGIES: ICD-10-CM

## 2025-02-03 DIAGNOSIS — J45.20 MILD INTERMITTENT ASTHMA WITHOUT COMPLICATION (HHS-HCC): ICD-10-CM

## 2025-02-03 DIAGNOSIS — Z00.00 WELL ADULT HEALTH CHECK: Primary | ICD-10-CM

## 2025-02-03 DIAGNOSIS — Z23 NEED FOR SHINGLES VACCINE: ICD-10-CM

## 2025-02-03 DIAGNOSIS — R73.03 PRE-DIABETES: ICD-10-CM

## 2025-02-03 DIAGNOSIS — Z82.49 FAMILY HISTORY OF EARLY CAD: ICD-10-CM

## 2025-02-03 LAB — POC HEMOGLOBIN A1C: 5.9 % (ref 4.2–6.5)

## 2025-02-03 PROCEDURE — 90750 HZV VACC RECOMBINANT IM: CPT | Performed by: FAMILY MEDICINE

## 2025-02-03 PROCEDURE — 90471 IMMUNIZATION ADMIN: CPT | Performed by: FAMILY MEDICINE

## 2025-02-03 PROCEDURE — 99396 PREV VISIT EST AGE 40-64: CPT | Performed by: FAMILY MEDICINE

## 2025-02-03 PROCEDURE — 83036 HEMOGLOBIN GLYCOSYLATED A1C: CPT | Performed by: FAMILY MEDICINE

## 2025-02-03 PROCEDURE — 99214 OFFICE O/P EST MOD 30 MIN: CPT | Performed by: FAMILY MEDICINE

## 2025-02-03 PROCEDURE — 1036F TOBACCO NON-USER: CPT | Performed by: FAMILY MEDICINE

## 2025-02-03 RX ORDER — FLUTICASONE PROPIONATE 50 MCG
2 SPRAY, SUSPENSION (ML) NASAL DAILY
Qty: 48 G | Refills: 1 | Status: SHIPPED | OUTPATIENT
Start: 2025-02-03

## 2025-02-03 RX ORDER — ALLOPURINOL 300 MG/1
300 TABLET ORAL DAILY
Qty: 100 TABLET | Refills: 1 | Status: SHIPPED | OUTPATIENT
Start: 2025-02-03

## 2025-02-03 RX ORDER — MONTELUKAST SODIUM 10 MG/1
10 TABLET ORAL NIGHTLY
Qty: 100 TABLET | Refills: 1 | Status: SHIPPED | OUTPATIENT
Start: 2025-02-03

## 2025-02-03 NOTE — ASSESSMENT & PLAN NOTE
High triglycerides, low HDL. Therapeutic lifestyle changes. Discussed risks:benefits of CT Coronary Artery Calcium Score. Wants to think about it. Order provided in case he decides he wants to do it.  Orders:    Follow Up In Primary Care - Established; Future    Hemoglobin A1C; Future    Lipid Panel; Future    Comprehensive Metabolic Panel; Future    CT cardiac scoring wo IV contrast; Future

## 2025-02-03 NOTE — ASSESSMENT & PLAN NOTE
Well controlled.   Orders:    fluticasone (Flonase) 50 mcg/actuation nasal spray; Administer 2 sprays into each nostril once daily. Shake gently. Before first use, prime pump. After use, clean tip and replace cap.

## 2025-02-03 NOTE — ASSESSMENT & PLAN NOTE
Check CT Coronary Artery Calcium Score, if agreeable.  Orders:    Follow Up In Primary Care - Established; Future    CT cardiac scoring wo IV contrast; Future

## 2025-02-03 NOTE — ASSESSMENT & PLAN NOTE
54yM doing fairly well. Declines low-dose CT, but will let us know if he changes his mind and decides he wants to do this.

## 2025-02-03 NOTE — ASSESSMENT & PLAN NOTE
Well controlled.   Orders:    montelukast (Singulair) 10 mg tablet; Take 1 tablet (10 mg) by mouth once daily at bedtime.

## 2025-02-03 NOTE — ASSESSMENT & PLAN NOTE
Well controlled.   Orders:    allopurinol (Zyloprim) 300 mg tablet; Take 1 tablet (300 mg) by mouth once daily.    Follow Up In Primary Care - Established; Future    Uric Acid; Future

## 2025-02-03 NOTE — PROGRESS NOTES
Subjective   Patient ID: Daniel Hicks is a 54 y.o. male h/o BPH, who presents for Follow-up (Pt presents for med review, rx's needed, no concerns.).  HPI Historian(s): Self    Generally feeling well.    Denies FMHx colon cancer, PMHx colon polyps, IBD, positive CRC screening test, bloody or black tarry stool, decreased caliber of the stool, diarrhea, constipation, abdominal pain, unintentional weight loss.     Review of Systems   All other systems reviewed and are negative.    No LMP for male patient.    Patient Care Team:  Ariel Posadas DO as PCP - General (Family Medicine)  Danielle Aguilar MD as Surgeon (Urology)    Current Outpatient Medications   Medication Instructions    albuterol 90 mcg/actuation inhaler 2 puffs, Every 4 hours PRN    allopurinol (ZYLOPRIM) 300 mg, oral, Daily    famotidine (PEPCID) 20 mg, oral, Daily    fluticasone (Flonase) 50 mcg/actuation nasal spray 2 sprays, Each Nostril, Daily, Shake gently. Before first use, prime pump. After use, clean tip and replace cap.    montelukast (SINGULAIR) 10 mg, oral, Nightly    tamsulosin (FLOMAX) 0.4 mg, oral, Nightly       Objective   /85   Pulse 98   Wt 100 kg (220 lb 8 oz)   SpO2 93%   BMI 36.41 kg/m²           Physical Exam  Vitals and nursing note reviewed.   Constitutional:       General: He is not in acute distress.     Appearance: Normal appearance. He is well-developed.      Comments: No assistive device presently being used.   HENT:      Head: Normocephalic and atraumatic.      Nose: Nose normal.   Eyes:      General: No scleral icterus.     Extraocular Movements: Extraocular movements intact.      Conjunctiva/sclera: Conjunctivae normal.   Neck:      Thyroid: No thyromegaly.      Vascular: No carotid bruit or JVD.   Cardiovascular:      Rate and Rhythm: Normal rate and regular rhythm.      Heart sounds: Normal heart sounds.   Pulmonary:      Effort: Pulmonary effort is normal. No respiratory distress.      Breath  sounds: Normal breath sounds.   Abdominal:      General: Bowel sounds are normal. There is no distension.      Palpations: Abdomen is soft. There is no mass.      Tenderness: There is no abdominal tenderness. There is no guarding or rebound.   Musculoskeletal:      Cervical back: Normal range of motion. No tenderness.      Right lower leg: No edema.      Left lower leg: No edema.   Skin:     General: Skin is warm and dry.      Coloration: Skin is not jaundiced.   Neurological:      General: No focal deficit present.      Mental Status: He is alert and oriented to person, place, and time. Mental status is at baseline.   Psychiatric:         Mood and Affect: Mood normal.         Behavior: Behavior normal.         Thought Content: Thought content normal.         Assessment & Plan  Well adult health check  54yM doing fairly well. Declines low-dose CT, but will let us know if he changes his mind and decides he wants to do this.        Seasonal allergies  Well controlled.   Orders:    fluticasone (Flonase) 50 mcg/actuation nasal spray; Administer 2 sprays into each nostril once daily. Shake gently. Before first use, prime pump. After use, clean tip and replace cap.    Idiopathic chronic gout of left knee without tophus  Well controlled.   Orders:    allopurinol (Zyloprim) 300 mg tablet; Take 1 tablet (300 mg) by mouth once daily.    Follow Up In Primary Care - Established; Future    Uric Acid; Future    Mild intermittent asthma without complication (HHS-HCC)  Well controlled.   Orders:    montelukast (Singulair) 10 mg tablet; Take 1 tablet (10 mg) by mouth once daily at bedtime.    Dyslipidemia  High triglycerides, low HDL. Therapeutic lifestyle changes. Discussed risks:benefits of CT Coronary Artery Calcium Score. Wants to think about it. Order provided in case he decides he wants to do it.  Orders:    Follow Up In Primary Care - Established; Future    Hemoglobin A1C; Future    Lipid Panel; Future    Comprehensive  Metabolic Panel; Future    CT cardiac scoring wo IV contrast; Future    Family history of early CAD  Check CT Coronary Artery Calcium Score, if agreeable.  Orders:    Follow Up In Primary Care - Established; Future    CT cardiac scoring wo IV contrast; Future    Screening for colon cancer    Orders:    Cologuard® colon cancer screening; Future    Need for shingles vaccine    Orders:    Zoster vaccine, recombinant, adult (SHINGRIX)    Zoster vaccine, recombinant, adult (SHINGRIX); Future    Follow Up In Primary Care - Established; Future    Pre-diabetes  Therapeutic lifestyle changes.  Orders:    POCT glycosylated hemoglobin (Hb A1C) manually resulted          Declines:  vaccine(s) (Shingrix, reports he already got influenza and pneumococcal)

## 2025-02-03 NOTE — PATIENT INSTRUCTIONS
"The CT coronary artery calcium (CAC) score is calculated from a CT scan of the heart done without contrast and synchronized with the heartbeat to quantify calcifications in the coronary arteries. The CAC score can help to estimate the risk of heart attack, and can help guide therapy decisions (e.g., whether or not to start a statin, see a cardiologist, etc.). The CT will miss significant lesions about 5% of the time. False positives are rare, but can happen, and could lead to additional \"unnecessary\" testing, procedures, referrals, anxiety, etc. CT CAC scoring can not identify narrowing of the arteries. There is a possibility of identifying things that may have otherwise gone unnoticed, which may be a good thing, but which may instead lead to \"unnecessary\" testing, procedures, referrals, anxiety, etc. The CAC score is generally not repeated more frequently than every 5 years.    Please return for a(n) pre-diabetes, gout, allergies, Shingrix (shingles) vaccine second dose, and medication follow-up appointment in 5-6 months, after tests to review results and options, earlier if any question or concern. Please return for your next Wellness visit in 12 months. Please schedule additional problem-focused appointment(s) to address additional problem(s).    Recommended vaccines:  Influenza, annual  Prevnar-20 \"pneumonia\" vaccine  Shingrix (shingles) vaccine series  Avoid taking Biotin (a vitamin, shows up particularly in hair/nail supplements) for a week prior to any blood tests, as it can interfere with certain results. Fasting for labs means 12 hours, nothing to eat or drink, except water and medications, unless directed otherwise.    For assistance with scheduling referrals or consultations, please call [phone]. For laboratory, radiology, and other tests, please call [phone] ([phone] for pediatrics). Please review prescription inserts and published information for possible adverse effects of all " medications. Return after testing or consultation to review results and recommendations, if symptoms persist, change, worsen, or return, or if you have any question or concern. If you do not get results within 7-10 days, or you have any question or concern, please send a message, call the office ([phone]), or return to the office for a follow-up appointment. For non-emergencies, you may call the office. For emergencies, call 9-1-1 or go to the nearest Emergency Department. Please schedule additional appointment(s) to address concern(s) not addressed today. An annual Wellness visit is strongly recommended. A Wellness visit should be dedicated to addressing routine health maintenance matters (e.g., cancer screenings, cardiovascular screening, etc.). Problem-focused visits, typically prompted by symptoms or specific concerns, are usually conducted separately, particularly if multiple or complex problems need to be addressed.    In general, results are not released or discussed over the telephone, but at an appointment or via  [employer]. Results of tests done through [facility] are released via  [employer] (see below).  [email or web address]   [employer] support line: [phone]

## 2025-02-10 PROCEDURE — RXMED WILLOW AMBULATORY MEDICATION CHARGE

## 2025-02-12 ENCOUNTER — PHARMACY VISIT (OUTPATIENT)
Dept: PHARMACY | Facility: CLINIC | Age: 55
End: 2025-02-12
Payer: COMMERCIAL

## 2025-03-06 LAB — NONINV COLON CA DNA+OCC BLD SCRN STL QL: NEGATIVE

## 2025-05-07 PROCEDURE — RXMED WILLOW AMBULATORY MEDICATION CHARGE

## 2025-05-08 ENCOUNTER — PHARMACY VISIT (OUTPATIENT)
Dept: PHARMACY | Facility: CLINIC | Age: 55
End: 2025-05-08
Payer: COMMERCIAL

## 2025-07-03 DIAGNOSIS — E78.5 DYSLIPIDEMIA: ICD-10-CM

## 2025-07-03 DIAGNOSIS — M1A.0620 IDIOPATHIC CHRONIC GOUT OF LEFT KNEE WITHOUT TOPHUS: ICD-10-CM

## 2025-07-07 ENCOUNTER — APPOINTMENT (OUTPATIENT)
Dept: PRIMARY CARE | Facility: CLINIC | Age: 55
End: 2025-07-07
Payer: COMMERCIAL

## 2025-07-07 VITALS
HEART RATE: 67 BPM | BODY MASS INDEX: 36.19 KG/M2 | WEIGHT: 219.13 LBS | DIASTOLIC BLOOD PRESSURE: 76 MMHG | OXYGEN SATURATION: 98 % | SYSTOLIC BLOOD PRESSURE: 149 MMHG

## 2025-07-07 DIAGNOSIS — R73.03 PRE-DIABETES: ICD-10-CM

## 2025-07-07 DIAGNOSIS — J45.20 MILD INTERMITTENT ASTHMA WITHOUT COMPLICATION (HHS-HCC): Primary | ICD-10-CM

## 2025-07-07 DIAGNOSIS — M1A.0620 IDIOPATHIC CHRONIC GOUT OF LEFT KNEE WITHOUT TOPHUS: ICD-10-CM

## 2025-07-07 DIAGNOSIS — Z82.49 FAMILY HISTORY OF EARLY CAD: ICD-10-CM

## 2025-07-07 DIAGNOSIS — J30.2 SEASONAL ALLERGIES: ICD-10-CM

## 2025-07-07 DIAGNOSIS — K21.9 GASTROESOPHAGEAL REFLUX DISEASE, UNSPECIFIED WHETHER ESOPHAGITIS PRESENT: ICD-10-CM

## 2025-07-07 DIAGNOSIS — Z23 NEED FOR SHINGLES VACCINE: ICD-10-CM

## 2025-07-07 DIAGNOSIS — E78.1 HYPERTRIGLYCERIDEMIA: ICD-10-CM

## 2025-07-07 DIAGNOSIS — R03.0 ELEVATED BP WITHOUT DIAGNOSIS OF HYPERTENSION: ICD-10-CM

## 2025-07-07 DIAGNOSIS — E78.5 DYSLIPIDEMIA: ICD-10-CM

## 2025-07-07 PROCEDURE — 1036F TOBACCO NON-USER: CPT | Performed by: FAMILY MEDICINE

## 2025-07-07 PROCEDURE — 99214 OFFICE O/P EST MOD 30 MIN: CPT | Performed by: FAMILY MEDICINE

## 2025-07-07 RX ORDER — MONTELUKAST SODIUM 10 MG/1
10 TABLET ORAL NIGHTLY
Qty: 100 TABLET | Refills: 1 | Status: SHIPPED | OUTPATIENT
Start: 2025-07-07

## 2025-07-07 RX ORDER — ALLOPURINOL 300 MG/1
300 TABLET ORAL DAILY
Qty: 100 TABLET | Refills: 1 | Status: SHIPPED | OUTPATIENT
Start: 2025-07-07

## 2025-07-07 RX ORDER — FAMOTIDINE 20 MG/1
20 TABLET, FILM COATED ORAL DAILY
Start: 2025-07-07

## 2025-07-07 RX ORDER — ICOSAPENT ETHYL 1 G/1
2 CAPSULE ORAL
Qty: 120 CAPSULE | Refills: 3 | Status: SHIPPED | OUTPATIENT
Start: 2025-07-07 | End: 2025-07-10 | Stop reason: SDUPTHER

## 2025-07-07 RX ORDER — FLUTICASONE PROPIONATE 50 MCG
2 SPRAY, SUSPENSION (ML) NASAL DAILY
Qty: 48 G | Refills: 1 | Status: SHIPPED | OUTPATIENT
Start: 2025-07-07

## 2025-07-07 NOTE — ASSESSMENT & PLAN NOTE
Therapeutic lifestyle changes. Provided  Orders:    Follow Up In Primary Care - Established    LDL cholesterol, direct; Future

## 2025-07-07 NOTE — ASSESSMENT & PLAN NOTE
Well controlled.   Orders:    Follow Up In Primary Care - Established    allopurinol (Zyloprim) 300 mg tablet; Take 1 tablet (300 mg) by mouth once daily.

## 2025-07-07 NOTE — PROGRESS NOTES
Subjective   Patient ID: Daniel Hicks is a 55 y.o. male who presents for Follow-up (Pt presents for 6 month check up, will have labs drawn after visit, not sure of refills.).  HPI Historian(s): Self    Generally feeling well.    Review of Systems   All other systems reviewed and are negative.    No LMP for male patient.    Tobacco Use History[1]  Social History     Substance and Sexual Activity   Alcohol Use Yes    Alcohol/week: 6.0 standard drinks of alcohol    Types: 6 Shots of liquor per week     Social History     Substance and Sexual Activity   Drug Use Never       Family History[2]    Patient Care Team:  Ariel Posadas DO as PCP - General (Family Medicine)  Ariel Posadas DO as PCP - [facility] PCP  Danielle Aguilar MD as Surgeon (Urology)    RX Allergies[3]    Prior to Admission medications    Medication Sig Start Date End Date Taking? Authorizing Provider   albuterol 90 mcg/actuation inhaler Inhale 2 puffs every 4 hours if needed. 8/10/22  Yes Historical Provider, MD   allopurinol (Zyloprim) 300 mg tablet Take 1 tablet (300 mg) by mouth once daily. 2/3/25  Yes Ariel Posadas DO   famotidine (Pepcid) 20 mg tablet Take 1 tablet (20 mg) by mouth once daily. 8/12/24  Yes Trey Brown MD   fluticasone (Flonase) 50 mcg/actuation nasal spray Administer 2 sprays into each nostril once daily. Shake gently. Before first use, prime pump. After use, clean tip and replace cap. 2/3/25  Yes Ariel Posadas DO   montelukast (Singulair) 10 mg tablet Take 1 tablet (10 mg) by mouth once daily at bedtime. 2/3/25  Yes Ariel Posadas DO   tamsulosin (Flomax) 0.4 mg 24 hr capsule Take 1 capsule (0.4 mg) by mouth once daily at bedtime. 12/3/24  Yes Danielle Aguilar MD        Objective     Vitals:    07/07/25 1005 07/07/25 1009   BP: 150/81  Comment: R 149/76  Comment: L   Pulse: 67    SpO2: 98%    Weight: 99.4 kg (219 lb 2 oz)         Lab Results   Component Value Date    HGBA1C 5.9 02/03/2025  "   HGBA1C 5.7 (H) 10/24/2024    HGBA1C 5.3 08/12/2022     No results found for: \"MICROALBCREA\"   Lab Results   Component Value Date    LDLCALC  10/24/2024      Comment:      The calculation of LDL and VLDL are inaccurate when the Triglycerides are greater than 400 mg/dL or when the patient is non-fasting. If LDL measurement is necessary contact the testing laboratory for an alternative LDL assay.                                  Near   Borderline      AGE      Desirable  Optimal    High     High     Very High     0-19 Y     0 - 109     ---    110-129   >/= 130     ----    20-24 Y     0 - 119     ---    120-159   >/= 160     ----      >24 Y     0 -  99   100-129  130-159   160-189     >/=190      LDLCALC 65 11/21/2023     Lab Results   Component Value Date    TRIG 511 (H) 10/24/2024    TRIG 335 (H) 11/21/2023      Lab Results   Component Value Date    CREATININE 1.15 10/24/2024     Lab Results   Component Value Date    EGFR 77 10/24/2024      No results found for: \"TSH\"   Lab Results   Component Value Date    PSA 0.18 01/22/2025    PSA 0.19 10/24/2023         Physical Exam  Vitals and nursing note reviewed.   Constitutional:       General: He is not in acute distress.     Appearance: Normal appearance. He is well-developed.      Comments: No assistive device presently being used.   HENT:      Head: Normocephalic and atraumatic.      Nose: Nose normal.   Eyes:      General: No scleral icterus.     Extraocular Movements: Extraocular movements intact.      Conjunctiva/sclera: Conjunctivae normal.   Neck:      Thyroid: No thyromegaly.      Vascular: No carotid bruit or JVD.   Cardiovascular:      Rate and Rhythm: Normal rate and regular rhythm.      Heart sounds: Normal heart sounds.   Pulmonary:      Effort: Pulmonary effort is normal. No respiratory distress.      Breath sounds: Normal breath sounds.   Abdominal:      General: Bowel sounds are normal. There is no distension.      Palpations: Abdomen is soft. There is " no mass.      Tenderness: There is no abdominal tenderness. There is no guarding or rebound.   Musculoskeletal:      Cervical back: Normal range of motion. No tenderness.      Right lower leg: No edema.      Left lower leg: No edema.   Skin:     General: Skin is warm and dry.      Coloration: Skin is not jaundiced.   Neurological:      General: No focal deficit present.      Mental Status: He is alert and oriented to person, place, and time. Mental status is at baseline.   Psychiatric:         Mood and Affect: Mood normal.         Behavior: Behavior normal.         Thought Content: Thought content normal.         Assessment & Plan  Mild intermittent asthma without complication (HHS-HCC)  Well controlled.   Orders:    montelukast (Singulair) 10 mg tablet; Take 1 tablet (10 mg) by mouth once daily at bedtime.    famotidine (Pepcid) 20 mg tablet; Take 1 tablet (20 mg) by mouth once daily.    Idiopathic chronic gout of left knee without tophus  Well controlled.   Orders:    Follow Up In Primary Care - Established    allopurinol (Zyloprim) 300 mg tablet; Take 1 tablet (300 mg) by mouth once daily.    Dyslipidemia  Therapeutic lifestyle changes. Provided  Orders:    Follow Up In Primary Care - Established    LDL cholesterol, direct; Future    Family history of early CAD    Orders:    Follow Up In Primary Care - Established    Need for shingles vaccine    Orders:    Follow Up In Primary Care - Established    Seasonal allergies    Orders:    fluticasone (Flonase) 50 mcg/actuation nasal spray; Administer 2 sprays into each nostril once daily. Shake gently. Before first use, prime pump. After use, clean tip and replace cap.    Pre-diabetes  Therapeutic lifestyle changes.       Gastroesophageal reflux disease, unspecified whether esophagitis present         Elevated BP without diagnosis of hypertension    Orders:    Follow Up In Primary Care - Established; Future    Hypertriglyceridemia    Orders:    icosapent ethyL (Vascepa)  1 gram capsule; Take 2 capsules (2 g) by mouth 2 times daily (morning and late afternoon).    Comprehensive Metabolic Panel; Future    Lipid Panel; Future                      [1]   Social History  Tobacco Use   Smoking Status Former    Current packs/day: 0.00    Average packs/day: 1 pack/day for 30.0 years (30.0 ttl pk-yrs)    Types: Cigarettes    Start date: 1990    Quit date: 2019    Years since quittin.5   Smokeless Tobacco Never   [2]   Family History  Problem Relation Name Age of Onset    Heart disease Mother Shahrzad Murray 60 - 69    Hypertension Mother Shahrzad Murray     Kidney disease Mother Shahrzad Murray     Heart attack Father  40 - 49   [3] No Known Allergies

## 2025-07-07 NOTE — ASSESSMENT & PLAN NOTE
Well controlled.   Orders:    montelukast (Singulair) 10 mg tablet; Take 1 tablet (10 mg) by mouth once daily at bedtime.    famotidine (Pepcid) 20 mg tablet; Take 1 tablet (20 mg) by mouth once daily.

## 2025-07-07 NOTE — PATIENT INSTRUCTIONS
"  Please return for a(n) blood pressure, asthma, gout, and medication follow-up appointment in 3-4 months, after tests to review results and options, earlier if any question or concern. Please return for your next Wellness visit in February 2026. Please schedule additional problem-focused appointment(s) to address additional problem(s). Simply mentioning or talking briefly about a problem or concern does not necessarily mean it is currently being addressed. Time constraints dictate that not every problem/concern can always be addressed.    Monitor your resting blood pressure (BP) and heart rate (HR) at home. Resting BP should be fairly consistently lower than 140/90, preferably better than 130/80. Systolic BP (the top number) should generally stay higher than 100. Normal HR range is , as low as 50 might be acceptable. Please bring your blood pressure machine to your appointments. For a list of validated BP machines: https://www.validatebp.org/  Recommended vaccines:  Influenza, annual  Prevnar-20 \"pneumonia\" vaccine  Shingrix (shingles) vaccine series  Avoid taking Biotin (a vitamin, shows up particularly in hair/nail supplements) for a week prior to any blood tests, as it can interfere with certain results. Fasting for labs means 12 hours, nothing to eat or drink, except water and medications, unless directed otherwise.    For assistance with scheduling referrals or consultations, please call [phone]. For laboratory, radiology, and other tests, please call [phone] ([phone] for pediatrics). Please review prescription inserts and published information for possible adverse effects of all medications. Return after testing or consultation to review results and recommendations, if symptoms persist, change, worsen, or return, or if you have any question or concern. If you do not get results within 7-10 days, or you have any question or concern, please send a message, call the office ([phone]), or " return to the office for a follow-up appointment. For non-emergencies, you may call the office. For emergencies, call 9-1-1 or go to the nearest Emergency Department. Please schedule additional appointment(s) to address concern(s) not addressed today. An annual Wellness visit is strongly recommended. A Wellness visit should be dedicated to addressing routine health maintenance matters (e.g., cancer screenings, cardiovascular screening, etc.). Problem-focused visits, typically prompted by symptoms or specific concerns, are usually conducted separately, particularly if multiple or complex problems need to be addressed.    In general, results are not released or discussed over the telephone, but at an appointment or via  [employer]. Results of tests done through [facility] are released via  [employer] (see below).  [email or web address]   [employer] support line: [phone]

## 2025-07-08 LAB
ALBUMIN SERPL-MCNC: 4.3 G/DL (ref 3.6–5.1)
ALP SERPL-CCNC: 75 U/L (ref 35–144)
ALT SERPL-CCNC: 30 U/L (ref 9–46)
ANION GAP SERPL CALCULATED.4IONS-SCNC: 8 MMOL/L (CALC) (ref 7–17)
AST SERPL-CCNC: 27 U/L (ref 10–35)
BILIRUB SERPL-MCNC: 0.5 MG/DL (ref 0.2–1.2)
BUN SERPL-MCNC: 17 MG/DL (ref 7–25)
CALCIUM SERPL-MCNC: 9.1 MG/DL (ref 8.6–10.3)
CHLORIDE SERPL-SCNC: 104 MMOL/L (ref 98–110)
CHOLEST SERPL-MCNC: 174 MG/DL
CHOLEST/HDLC SERPL: 4.6 (CALC)
CO2 SERPL-SCNC: 25 MMOL/L (ref 20–32)
CREAT SERPL-MCNC: 0.96 MG/DL (ref 0.7–1.3)
EGFRCR SERPLBLD CKD-EPI 2021: 95 ML/MIN/1.73M2
EST. AVERAGE GLUCOSE BLD GHB EST-MCNC: 123 MG/DL
EST. AVERAGE GLUCOSE BLD GHB EST-SCNC: 6.8 MMOL/L
GLUCOSE SERPL-MCNC: 118 MG/DL (ref 65–99)
HBA1C MFR BLD: 5.9 %
HDLC SERPL-MCNC: 38 MG/DL
LDLC SERPL CALC-MCNC: 95 MG/DL (CALC)
LDLC SERPL DIRECT ASSAY-MCNC: 82 MG/DL
NONHDLC SERPL-MCNC: 136 MG/DL (CALC)
POTASSIUM SERPL-SCNC: 4.1 MMOL/L (ref 3.5–5.3)
PROT SERPL-MCNC: 7.1 G/DL (ref 6.1–8.1)
SODIUM SERPL-SCNC: 137 MMOL/L (ref 135–146)
TRIGL SERPL-MCNC: 285 MG/DL
URATE SERPL-MCNC: 5.1 MG/DL (ref 4–8)

## 2025-07-10 DIAGNOSIS — E78.1 HYPERTRIGLYCERIDEMIA: ICD-10-CM

## 2025-07-12 RX ORDER — ICOSAPENT ETHYL 1 G/1
2 CAPSULE ORAL
Qty: 120 CAPSULE | Refills: 3 | Status: SHIPPED | OUTPATIENT
Start: 2025-07-12

## 2025-08-04 PROCEDURE — RXMED WILLOW AMBULATORY MEDICATION CHARGE

## 2025-08-05 ENCOUNTER — PHARMACY VISIT (OUTPATIENT)
Dept: PHARMACY | Facility: CLINIC | Age: 55
End: 2025-08-05
Payer: COMMERCIAL

## 2026-03-05 ENCOUNTER — APPOINTMENT (OUTPATIENT)
Dept: PRIMARY CARE | Facility: CLINIC | Age: 56
End: 2026-03-05
Payer: COMMERCIAL